# Patient Record
Sex: MALE | Race: WHITE | Employment: UNEMPLOYED | URBAN - METROPOLITAN AREA
[De-identification: names, ages, dates, MRNs, and addresses within clinical notes are randomized per-mention and may not be internally consistent; named-entity substitution may affect disease eponyms.]

---
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
## 2021-03-10 DIAGNOSIS — Z23 ENCOUNTER FOR IMMUNIZATION: ICD-10-CM

## 2022-01-23 NOTE — PROGRESS NOTES
Daily Note     Today's date: 2018  Patient name: Krzysztof Hanks  : 2000  MRN: [id number]  Referring provider: Nhan Underwood MD  Dx:   Encounter Diagnosis     ICD-10-CM    1  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, subsequent encounter [facility]) S14 107D                   Subjective: Byron Bridges was seen today without his parents  He drove himself today and had no issues or reports        Objective: See treatment diary below  See treatment diary below                             Manual  18         Joint mobilizations   Posterior direction of the shoulder 3 min  posterior direction of the shoulder   posteiror direction of the shoulder  posterior direction of the shoulder  posterior direction of the shoulder         MFR   Anterior chest muscle 5 min   anterior chest muscle 5 min  anterior chest muscle 5 min  anterior chest muscle 5 min  anterior chest muscle 5 min                                                                                 Exercise Diary  18         Stretching to bilateral hip flexors and lats 3 x 30 seconds 3 x 30 seconds  3 x 30 seconds  3 x 30 seconds  3 x 30 seconds  3 x 30 seconds         UBE  1000 workload in 8 5 min 1000 workload in 12 min  1000 workload in 10 min  1000 workload in 9 5 min  1000 workload in 11 5 min  1000 workload in 10 min         Rotator cuff manual resistance            completed x 20 reps         Cable column rope pulls  15 lbs x 16 reps supine    15 lbs x 10 reps seated                prone shoulder horizontal abduction therband                      prone pushups on bosu ball         done on pertubation mat with no TLSO  without TLSO            unilateral arm extension holds On bosu 10 sec hold      no TLSO              supine weighted ball chest throws completed 50 reps        completed x 60 reps  seated throws and holds          weighted bar holds            completed each side         Stair negotiation      completed 8 stairs               Shoulder depression                     tricep extension transfers onto platform surface          completed without TLSO   extensions only for tricep extensions         Wheelbarrow walks on pedaler                     No TLSO upright hold in short and long sit        completed  completed           No TLSO reaches with unilateral UE        completed  completed across body reaching           No TLSO lateral reaching for items and forward throws                     Total gym                      Superman in prone   3 min x 3 reps total        3 min done in one rep         Wrist weight movements   5 reps completed                 Shoulder press                     Treadmill wheelbarrow walk        completed 2 5 min needing 2 rest breaks             Weighted ball toss on rebounder   Complete dx 30 reps                 Pushups with alternating arm taps                        All manual therapy was tolerated well  Margarito completed the transfers in and out of the chair with the TLSO brace and with independence   Completed UBE machine with 1000 workload needing 10 minutes   Completed weighted ball throws as well as 20 second holds in shoulder flexion position  Completed tricep extension holds staying upright for 20-30 seconds and then finishing for a 1 minute hold  Prone superman holds reached 3 minutes for the hold which is a recent high for posterior muscle strengthening  Rotator cuff muscle resistance was completed x 20 reps without any issue  Finished the session with holds of a weighted bar lasting 2 minutes with both hands on the first try  Assessment: Tolerated treatment well  Patient would benefit from continued PT      Plan: Continue per plan of care  absent

## 2022-03-08 NOTE — PROGRESS NOTES
Daily Note     Today's date: 2018  Patient name: Perfecto Robertson  : 2000  MRN: [id number]  Referring provider: Cristopher Severin, MD  Dx:   Encounter Diagnosis     ICD-10-CM    1  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, subsequent encounter [facility]) [id number]                   Subjective: Roshan Fabian completed his session today with his mother present  Had no issues to report        Objective:   See treatment diary below  Manual  18       Joint mobilizations Posterior direction of the shoulder 3 min    posterior direction of the shoulder 3 min       MFR Anterior chest muscle (pec) 5 min    anterior chest muscle (pec) 5 min                                                       Exercise Diary  18       Stretching to bilateral hip flexors and lats 3 x 30 seconds    3 x 30 seconds       UBE  1000 work load in 9 min and 22 sec 1000 workload in 12 min  700 workload in 13 minutes       Rotator cuff manual resistance   Completed x 20 reps each arm and with IR and ER         Cable column rope pulls               prone shoulder horizontal abduction therband              prone pushups on bosu ball       3 sets x 12 reps        unilateral arm extension holds              supine weighted ball chest throws              weighted bar holds             Stair negotiation   Completed 8 stairs up and down         Shoulder depression   60 lbs x 20 reps         tricep extension transfers onto platform surface             Wheelbarrow walks on pedaler Without pedaler and TLSO           No TLSO upright hold in short and long sit 50-60 seconds in short sit    50 seconds in short sit       No TLSO reaches with unilateral UE 40-50 seconds in short sit    50 seconds in short sit       No TLSO lateral reaching for items and forward throws in short sit along with weighted ball holds    in short sit with weighted ball holds and throws       Total gym              Superman in prone  3 min 3 trials no TLSO              Wrist weight movements             Shoulder press   10 lbs x 20 reps         Bicep curl   25 lbs x 30 reps         Weighted ball toss on rebounder   2 kg ball x 20 reps            Margarito completed the transfers in and out of the chair to the mat table without the TLSO brace and with independence  Transfers into and out of the chair from the floor was completed with solid results, able to complete the transfers with just contact and guarding of the legs  All exercises were done well outside of the TLSO brace and were held for increased time periods including Uilateral holds of Uppers as well as seated ball throws each reaching 10-15 seconds before a forward fall  Impressively he was able to complete push ups on the tilt board surface resulting in solid results of full extension and flexion of bilateral arms in quadruped  Margarito struggled with keeping the low back solid without movement of swaying  Assessment: Tolerated treatment well  Patient would benefit from continued PT      Plan: Continue per plan of care  Advancement-Rotation Flap Text: The defect edges were debeveled with a #15 scalpel blade.  Given the location of the defect, shape of the defect and the proximity to free margins an advancement-rotation flap was deemed most appropriate.  Using a sterile surgical marker, an appropriate flap was drawn incorporating the defect and placing the expected incisions within the relaxed skin tension lines where possible. The area thus outlined was incised deep to adipose tissue with a #15 scalpel blade.  The skin margins were undermined to an appropriate distance in all directions utilizing iris scissors.